# Patient Record
Sex: MALE | NOT HISPANIC OR LATINO | Employment: FULL TIME | URBAN - METROPOLITAN AREA
[De-identification: names, ages, dates, MRNs, and addresses within clinical notes are randomized per-mention and may not be internally consistent; named-entity substitution may affect disease eponyms.]

---

## 2024-05-17 ENCOUNTER — LAB (OUTPATIENT)
Dept: LAB | Facility: LAB | Age: 39
End: 2024-05-17
Payer: COMMERCIAL

## 2024-05-17 ENCOUNTER — OFFICE VISIT (OUTPATIENT)
Dept: PRIMARY CARE | Facility: CLINIC | Age: 39
End: 2024-05-17
Payer: COMMERCIAL

## 2024-05-17 VITALS
BODY MASS INDEX: 33.82 KG/M2 | WEIGHT: 255.2 LBS | SYSTOLIC BLOOD PRESSURE: 125 MMHG | TEMPERATURE: 97.3 F | DIASTOLIC BLOOD PRESSURE: 82 MMHG | OXYGEN SATURATION: 96 % | HEIGHT: 73 IN | HEART RATE: 79 BPM

## 2024-05-17 DIAGNOSIS — J30.9 ALLERGIC RHINITIS, UNSPECIFIED SEASONALITY, UNSPECIFIED TRIGGER: ICD-10-CM

## 2024-05-17 DIAGNOSIS — R50.9 FEVER, UNSPECIFIED FEVER CAUSE: ICD-10-CM
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
## 2024-05-17 DIAGNOSIS — S30.861A TICK BITE OF ABDOMINAL WALL, INITIAL ENCOUNTER: Primary | ICD-10-CM

## 2024-05-17 DIAGNOSIS — F41.9 ANXIETY: ICD-10-CM

## 2024-05-17 DIAGNOSIS — S30.861A TICK BITE OF ABDOMINAL WALL, INITIAL ENCOUNTER: ICD-10-CM

## 2024-05-17 PROBLEM — N43.0: Status: ACTIVE | Noted: 2017-06-08

## 2024-05-17 PROBLEM — L98.9 SKIN LESION: Status: ACTIVE | Noted: 2024-05-17

## 2024-05-17 PROBLEM — I86.1 VARICOCELE: Status: ACTIVE | Noted: 2017-06-08

## 2024-05-17 PROBLEM — R46.81 OBSESSIVE-COMPULSIVE BEHAVIOR: Status: ACTIVE | Noted: 2024-05-17

## 2024-05-17 PROBLEM — N43.40 SPERMATOCELE: Status: ACTIVE | Noted: 2017-06-08

## 2024-05-17 PROBLEM — F43.21 GRIEF: Status: ACTIVE | Noted: 2024-05-17

## 2024-05-17 LAB
ALBUMIN SERPL BCP-MCNC: 4.4 G/DL (ref 3.4–5)
ALP SERPL-CCNC: 55 U/L (ref 33–120)
ALT SERPL W P-5'-P-CCNC: 63 U/L (ref 10–52)
ANION GAP SERPL CALC-SCNC: 13 MMOL/L (ref 10–20)
AST SERPL W P-5'-P-CCNC: 40 U/L (ref 9–39)
BASOPHILS # BLD MANUAL: 0 X10*3/UL (ref 0–0.1)
BASOPHILS NFR BLD MANUAL: 0 %
BILIRUB SERPL-MCNC: 0.5 MG/DL (ref 0–1.2)
BUN SERPL-MCNC: 19 MG/DL (ref 6–23)
BURR CELLS BLD QL SMEAR: NORMAL
CALCIUM SERPL-MCNC: 9.7 MG/DL (ref 8.6–10.6)
CHLORIDE SERPL-SCNC: 105 MMOL/L (ref 98–107)
CO2 SERPL-SCNC: 29 MMOL/L (ref 21–32)
CREAT SERPL-MCNC: 0.92 MG/DL (ref 0.5–1.3)
EGFRCR SERPLBLD CKD-EPI 2021: >90 ML/MIN/1.73M*2
EOSINOPHIL # BLD MANUAL: 0.05 X10*3/UL (ref 0–0.7)
EOSINOPHIL NFR BLD MANUAL: 0.9 %
ERYTHROCYTE [DISTWIDTH] IN BLOOD BY AUTOMATED COUNT: 12.9 % (ref 11.5–14.5)
ERYTHROCYTE [SEDIMENTATION RATE] IN BLOOD BY WESTERGREN METHOD: 4 MM/H (ref 0–15)
GLUCOSE SERPL-MCNC: 84 MG/DL (ref 74–99)
HCT VFR BLD AUTO: 46.2 % (ref 41–52)
HGB BLD-MCNC: 14.5 G/DL (ref 13.5–17.5)
IMM GRANULOCYTES # BLD AUTO: 0.02 X10*3/UL (ref 0–0.7)
IMM GRANULOCYTES NFR BLD AUTO: 0.4 % (ref 0–0.9)
LYMPHOCYTES # BLD MANUAL: 1.24 X10*3/UL (ref 1.2–4.8)
LYMPHOCYTES NFR BLD MANUAL: 21.8 %
MCH RBC QN AUTO: 27.7 PG (ref 26–34)
MCHC RBC AUTO-ENTMCNC: 31.4 G/DL (ref 32–36)
MCV RBC AUTO: 88 FL (ref 80–100)
MONOCYTES # BLD MANUAL: 0.35 X10*3/UL (ref 0.1–1)
MONOCYTES NFR BLD MANUAL: 6.1 %
NEUTROPHILS # BLD MANUAL: 3.52 X10*3/UL (ref 1.2–7.7)
NEUTS BAND # BLD MANUAL: 0.3 X10*3/UL (ref 0–0.7)
NEUTS BAND NFR BLD MANUAL: 5.2 %
NEUTS SEG # BLD MANUAL: 3.22 X10*3/UL (ref 1.2–7)
NEUTS SEG NFR BLD MANUAL: 56.5 %
NRBC BLD-RTO: 0 /100 WBCS (ref 0–0)
PLASMA CELLS # BLD MANUAL: 0.1 X10*3/UL
PLASMA CELLS NFR BLD MANUAL: 1.7 %
PLATELET # BLD AUTO: 308 X10*3/UL (ref 150–450)
POTASSIUM SERPL-SCNC: 5.3 MMOL/L (ref 3.5–5.3)
PROT SERPL-MCNC: 7.3 G/DL (ref 6.4–8.2)
RBC # BLD AUTO: 5.23 X10*6/UL (ref 4.5–5.9)
RBC MORPH BLD: NORMAL
SODIUM SERPL-SCNC: 142 MMOL/L (ref 136–145)
TOTAL CELLS COUNTED BLD: 115
VARIANT LYMPHS # BLD MANUAL: 0.44 X10*3/UL (ref 0–0.5)
VARIANT LYMPHS NFR BLD: 7.8 %
WBC # BLD AUTO: 5.7 X10*3/UL (ref 4.4–11.3)

## 2024-05-17 PROCEDURE — 85027 COMPLETE CBC AUTOMATED: CPT

## 2024-05-17 PROCEDURE — 85007 BL SMEAR W/DIFF WBC COUNT: CPT

## 2024-05-17 PROCEDURE — 87476 LYME DIS DNA AMP PROBE: CPT

## 2024-05-17 PROCEDURE — 99214 OFFICE O/P EST MOD 30 MIN: CPT | Performed by: INTERNAL MEDICINE

## 2024-05-17 PROCEDURE — 80053 COMPREHEN METABOLIC PANEL: CPT

## 2024-05-17 PROCEDURE — 86617 LYME DISEASE ANTIBODY: CPT

## 2024-05-17 PROCEDURE — 36415 COLL VENOUS BLD VENIPUNCTURE: CPT

## 2024-05-17 PROCEDURE — 86618 LYME DISEASE ANTIBODY: CPT

## 2024-05-17 PROCEDURE — 85652 RBC SED RATE AUTOMATED: CPT

## 2024-05-17 RX ORDER — DOXYCYCLINE 100 MG/1
100 CAPSULE ORAL
COMMUNITY
Start: 2024-05-12 | End: 2024-05-21 | Stop reason: SDUPTHER

## 2024-05-17 ASSESSMENT — PATIENT HEALTH QUESTIONNAIRE - PHQ9
1. LITTLE INTEREST OR PLEASURE IN DOING THINGS: NOT AT ALL
2. FEELING DOWN, DEPRESSED OR HOPELESS: NOT AT ALL
SUM OF ALL RESPONSES TO PHQ9 QUESTIONS 1 AND 2: 0

## 2024-05-17 NOTE — PROGRESS NOTES
"Subjective   Patient ID: Mino Negron is a 38 y.o. male who presents for New Patient Visit, Tick Removal (Patient was bit by a tick and is having symptoms of lyme disease.), and Discuss OCD behavior .    This is a 38-year-old male, patient of Dr. Garay, was advised by his wife Inocencia, who is my patient, to be seen today to have blood work and because of concern about Lyme disease . On 4/28 Patient went for a walk at [facility]  and few days after on May 1, 2024 he noticed a tick over the right lower abdomen that was attached, it was alive and his wife pulled it out with her fingers and  kept it in a jar and on Sunday,then he started noticing some redness around the tick bite site with a bulls eyes and on 5/19 he started feeling unwell,had fever, fatigue ,temperature 103 ,felt cold ,headache and joint pain, he also noted enlarged lymph node in the right groin area , he went to the urgent care and was started on doxycycline for 2 weeks, they did not do any blood tests as per patient, they did identify the tick when showed to the physician at the urgent care by patient starts feeling better his,fever came down.  He is known to have OCD and anxiety.         Review of Systems   Constitutional:  Positive for fatigue and fever.        As HPI.   HENT: Negative.     Eyes: Negative.    Respiratory: Negative.     Cardiovascular: Negative.    Gastrointestinal: Negative.    Endocrine: Negative.    Genitourinary: Negative.    Musculoskeletal:         As HPI   Neurological:  Positive for headaches.   Hematological:  Positive for adenopathy. Does not bruise/bleed easily.        Noted enlarged lymph node right inguinal area.   Psychiatric/Behavioral:          As anxiety and OCD.       Objective   /82   Pulse 79   Temp 36.3 °C (97.3 °F) (Temporal)   Ht 1.854 m (6' 1\")   Wt 116 kg (255 lb 3.2 oz)   SpO2 96%   BMI 33.67 kg/m²     Physical Exam  Vitals reviewed.   Constitutional:       General: He is not " in acute distress.     Appearance: Normal appearance.   HENT:      Head: Normocephalic and atraumatic.   Eyes:      Extraocular Movements: Extraocular movements intact.      Pupils: Pupils are equal, round, and reactive to light.   Cardiovascular:      Rate and Rhythm: Normal rate and regular rhythm.      Heart sounds: Normal heart sounds.   Pulmonary:      Effort: Pulmonary effort is normal. No respiratory distress.      Breath sounds: Normal breath sounds. No wheezing or rhonchi.   Abdominal:      General: Abdomen is flat. Bowel sounds are normal.      Palpations: Abdomen is soft.      Tenderness: There is no abdominal tenderness.      Comments: Noted oval-shaped erythematous skin eruption over right lower abdomen centered by the tick bite site, no retained insect noted, no drainage and no tenderness.  Small enlarged lymph node right inguinal area.   Musculoskeletal:      Cervical back: Normal range of motion and neck supple.      Right lower leg: No edema.      Left lower leg: No edema.   Skin:     General: Skin is warm and dry.      Comments: Tick bite site with surrounding erythema right lower abdomen   Neurological:      General: No focal deficit present.      Mental Status: He is alert and oriented to person, place, and time.   Psychiatric:         Mood and Affect: Mood normal.         Behavior: Behavior normal.         Assessment/Plan   Problem List Items Addressed This Visit             ICD-10-CM    Anxiety F41.9     Patient to discuss his anxiety and OCD with his PCP.  Recommend counseling and treatment.         Tick bite of abdominal wall - Primary S30.861A, W57.XXXA     Suspect Lyme disease.  Continue doxycycline, already had prescription for 2 weeks.  Will order lab.  Advised to follow-up with his PCP Dr. Garay in 1 to 2 weeks.           Relevant Orders    CBC and Auto Differential (Completed)    Comprehensive metabolic panel (Completed)    Sedimentation Rate (Completed)    Lyme AB Modified 2-Tier  Testing, 1st Tier    Lyme Disease (Borrelia burgdorferi), PCR    Allergic rhinitis J30.9     Zyrtec as needed.          Other Visit Diagnoses         Codes    Fever, unspecified fever cause     R50.9    Relevant Orders    CBC and Auto Differential (Completed)    Comprehensive metabolic panel (Completed)    Sedimentation Rate (Completed)

## 2024-05-18 PROBLEM — J30.9 ALLERGIC RHINITIS: Status: ACTIVE | Noted: 2024-05-18

## 2024-05-18 ASSESSMENT — ENCOUNTER SYMPTOMS
FATIGUE: 1
BRUISES/BLEEDS EASILY: 0
ADENOPATHY: 1
CARDIOVASCULAR NEGATIVE: 1
EYES NEGATIVE: 1
FEVER: 1
GASTROINTESTINAL NEGATIVE: 1
RESPIRATORY NEGATIVE: 1
ENDOCRINE NEGATIVE: 1
HEADACHES: 1

## 2024-05-18 NOTE — ASSESSMENT & PLAN NOTE
Suspect Lyme disease.  Continue doxycycline, already had prescription for 2 weeks.  Will order lab.  Advised to follow-up with his PCP Dr. Garay in 1 to 2 weeks.

## 2024-05-20 LAB
B BURGDOR IGG SERPL QL IA: 0.59 IV
B BURGDOR IGG+IGM SER IA-IMP: POSITIVE
B BURGDOR IGM SERPL QL IA: 6.44 IV
B BURGDOR.VLSE1+PEPC10 AB SER IA-ACNC: 2.92 IV

## 2024-05-21 ENCOUNTER — TELEPHONE (OUTPATIENT)
Dept: PRIMARY CARE | Facility: CLINIC | Age: 39
End: 2024-05-21
Payer: COMMERCIAL

## 2024-05-21 DIAGNOSIS — A69.20 ACUTE LYME DISEASE: Primary | ICD-10-CM

## 2024-05-21 LAB
B BURGDOR DNA SPEC QL NAA+PROBE: NOT DETECTED
SPECIMEN SOURCE: NORMAL

## 2024-05-21 RX ORDER — DOXYCYCLINE 100 MG/1
CAPSULE ORAL
Qty: 14 CAPSULE | Refills: 0 | Status: SHIPPED | OUTPATIENT
Start: 2024-05-21 | End: 2024-06-06 | Stop reason: WASHOUT

## 2024-05-21 NOTE — TELEPHONE ENCOUNTER
I called patient and discussed lab results with him, he currently has prescription for doxycycline 100 mg twice daily for total 14 days from the urgent care, it was started 2 weeks after the discovered tick bite, we will extend his doxycycline for total of 21-days due to his initial severe symptoms with the headache , fever and fatigue.  He will follow-up with Dr. Garay.

## 2024-05-21 NOTE — TELEPHONE ENCOUNTER
Patient was seen 5/17 for tick bite and prescribe medication. Patient states lab test confirmed Lyme disease on same day. Patient states he was already on the medication x 1 week prior to lab test and wants to know if he would need more doxycycline 100 mg.     Please advise

## 2024-06-06 ENCOUNTER — OFFICE VISIT (OUTPATIENT)
Dept: PRIMARY CARE | Facility: CLINIC | Age: 39
End: 2024-06-06
Payer: COMMERCIAL

## 2024-06-06 VITALS
OXYGEN SATURATION: 97 % | BODY MASS INDEX: 34.59 KG/M2 | HEART RATE: 72 BPM | WEIGHT: 261 LBS | RESPIRATION RATE: 16 BRPM | DIASTOLIC BLOOD PRESSURE: 85 MMHG | SYSTOLIC BLOOD PRESSURE: 124 MMHG | TEMPERATURE: 98.4 F | HEIGHT: 73 IN

## 2024-06-06 DIAGNOSIS — Z00.00 HEALTHCARE MAINTENANCE: ICD-10-CM

## 2024-06-06 DIAGNOSIS — R79.89 ELEVATED LFTS: Primary | ICD-10-CM

## 2024-06-06 PROCEDURE — 99395 PREV VISIT EST AGE 18-39: CPT | Performed by: INTERNAL MEDICINE

## 2024-06-06 PROCEDURE — 90715 TDAP VACCINE 7 YRS/> IM: CPT | Performed by: INTERNAL MEDICINE

## 2024-06-06 PROCEDURE — 1036F TOBACCO NON-USER: CPT | Performed by: INTERNAL MEDICINE

## 2024-06-06 PROCEDURE — 90471 IMMUNIZATION ADMIN: CPT | Performed by: INTERNAL MEDICINE

## 2024-06-06 ASSESSMENT — ENCOUNTER SYMPTOMS
HEMATURIA: 0
COUGH: 0
DYSURIA: 0
EYE PAIN: 0
PALPITATIONS: 0
NAUSEA: 0
ABDOMINAL PAIN: 0
FEVER: 0
CHEST TIGHTNESS: 0
WHEEZING: 0
SORE THROAT: 0
CONSTIPATION: 0
RHINORRHEA: 0
BLOOD IN STOOL: 0
WOUND: 0
DIARRHEA: 0
POLYPHAGIA: 0
CHILLS: 0
ARTHRALGIAS: 0
HEADACHES: 0
VOMITING: 0
UNEXPECTED WEIGHT CHANGE: 0
SHORTNESS OF BREATH: 0
DYSPHORIC MOOD: 0
DIZZINESS: 0
MYALGIAS: 0
NERVOUS/ANXIOUS: 1
POLYDIPSIA: 0
FREQUENCY: 0

## 2024-06-06 NOTE — PROGRESS NOTES
"Subjective   Patient ID: Mino Negron is a 38 y.o. male who presents for Annual Exam.    HPI     Finished antibiotics  All resolved  Feels back to normal.    Dental visits- UTD  Eye visits-glasses, overdue    Exercise-yoga 1x per week  Diet-not the best currently, eats a lot of fast food    Alcohol use-none since January  Smoking- stopped weed Jan, no cigs    He does worry a lot  Wonders if has OCD, autism, etc    Review of Systems   Constitutional:  Negative for chills, fever and unexpected weight change.   HENT:  Negative for congestion, hearing loss, rhinorrhea and sore throat.    Eyes:  Negative for pain and visual disturbance.   Respiratory:  Negative for cough, chest tightness, shortness of breath and wheezing.    Cardiovascular:  Negative for chest pain and palpitations.   Gastrointestinal:  Negative for abdominal pain, blood in stool, constipation, diarrhea, nausea and vomiting.   Endocrine: Negative for cold intolerance, heat intolerance, polydipsia and polyphagia.   Genitourinary:  Negative for dysuria, frequency and hematuria.   Musculoskeletal:  Negative for arthralgias and myalgias.   Skin:  Negative for rash and wound.   Neurological:  Negative for dizziness, syncope and headaches.   Psychiatric/Behavioral:  Negative for dysphoric mood. The patient is nervous/anxious.        Objective   /85 (BP Location: Left arm, Patient Position: Sitting, BP Cuff Size: Large adult)   Pulse 72   Temp 36.9 °C (98.4 °F)   Resp 16   Ht 1.854 m (6' 1\")   Wt 118 kg (261 lb)   SpO2 97%   BMI 34.43 kg/m²     Physical Exam  Vitals reviewed.   Constitutional:       Appearance: Normal appearance. He is not ill-appearing.   HENT:      Head: Normocephalic and atraumatic.      Right Ear: Tympanic membrane normal.      Left Ear: Tympanic membrane normal.      Nose: Nose normal.      Mouth/Throat:      Mouth: Mucous membranes are moist.      Pharynx: Oropharynx is clear.   Eyes:      Extraocular Movements: Extraocular " movements intact.      Conjunctiva/sclera: Conjunctivae normal.      Pupils: Pupils are equal, round, and reactive to light.   Cardiovascular:      Rate and Rhythm: Normal rate and regular rhythm.   Pulmonary:      Effort: Pulmonary effort is normal.      Breath sounds: Normal breath sounds. No wheezing.   Abdominal:      General: There is no distension.      Palpations: Abdomen is soft. There is no mass.      Tenderness: There is no abdominal tenderness.   Musculoskeletal:         General: No swelling.      Cervical back: Neck supple.   Lymphadenopathy:      Cervical: No cervical adenopathy.   Neurological:      General: No focal deficit present.      Mental Status: He is alert and oriented to person, place, and time.      Gait: Gait normal.   Psychiatric:         Mood and Affect: Mood normal.         Behavior: Behavior normal.         Thought Content: Thought content normal.         Assessment/Plan   Problem List Items Addressed This Visit    None  Visit Diagnoses         Codes    Elevated LFTs    -  Primary R79.89    Relevant Orders    Hepatic function panel    Healthcare maintenance     Z00.00    Relevant Orders    Lipid Panel          CPE completed.  Advised to keep a heart healthy, low fat  diet with fruits and veggies like Mediterranean diet.  Advised on the importance of exercise and maintaining 150 minutes of exercise per week (30 minutes per day 5 days a week).  Advised on regular eye and dental visits.  Discussed age appropriate cancer screening, immunizations and recommendations given.  Discussed avoiding illicit drugs and tobacco. Advised on appropriate use of alcohol.  Advised to wear seat belt.    Elevated lfts-repeat     OCD tendencies/? Autism-given info on how to schedule with psychologist if wishes to     Hx of Lyme disease     Tdap today  . works full time job and has [employer] (yoga classes at [employer], coffee shops, etc)     CPE 3 month  Check labs

## 2025-06-05 ASSESSMENT — PROMIS GLOBAL HEALTH SCALE
RATE_PHYSICAL_HEALTH: GOOD
RATE_MENTAL_HEALTH: GOOD
RATE_QUALITY_OF_LIFE: GOOD
RATE_AVERAGE_FATIGUE: MODERATE
RATE_AVERAGE_PAIN: 6
RATE_GENERAL_HEALTH: GOOD
CARRYOUT_PHYSICAL_ACTIVITIES: COMPLETELY
EMOTIONAL_PROBLEMS: OFTEN
RATE_SOCIAL_SATISFACTION: VERY GOOD
CARRYOUT_SOCIAL_ACTIVITIES: GOOD

## 2025-06-06 ENCOUNTER — APPOINTMENT (OUTPATIENT)
Dept: PRIMARY CARE | Facility: CLINIC | Age: 40
End: 2025-06-06
Payer: COMMERCIAL

## 2025-06-06 VITALS
WEIGHT: 239.8 LBS | OXYGEN SATURATION: 98 % | SYSTOLIC BLOOD PRESSURE: 110 MMHG | HEART RATE: 68 BPM | HEIGHT: 73 IN | BODY MASS INDEX: 31.78 KG/M2 | DIASTOLIC BLOOD PRESSURE: 78 MMHG

## 2025-06-06 DIAGNOSIS — R79.89 ELEVATED LFTS: ICD-10-CM

## 2025-06-06 DIAGNOSIS — Z00.00 HEALTHCARE MAINTENANCE: Primary | ICD-10-CM

## 2025-06-06 DIAGNOSIS — E78.5 HYPERLIPIDEMIA, UNSPECIFIED HYPERLIPIDEMIA TYPE: ICD-10-CM

## 2025-06-06 PROCEDURE — 99395 PREV VISIT EST AGE 18-39: CPT | Performed by: INTERNAL MEDICINE

## 2025-06-06 PROCEDURE — 3008F BODY MASS INDEX DOCD: CPT | Performed by: INTERNAL MEDICINE

## 2025-06-06 PROCEDURE — 1036F TOBACCO NON-USER: CPT | Performed by: INTERNAL MEDICINE

## 2025-06-06 ASSESSMENT — ENCOUNTER SYMPTOMS
SORE THROAT: 0
BLOOD IN STOOL: 0
DYSURIA: 0
HEADACHES: 0
SHORTNESS OF BREATH: 0
HEMATURIA: 0
ARTHRALGIAS: 0
RHINORRHEA: 0
DYSPHORIC MOOD: 0
CHILLS: 0
VOMITING: 0
NERVOUS/ANXIOUS: 1
WOUND: 0
ABDOMINAL PAIN: 0
PALPITATIONS: 0
EYE PAIN: 0
NAUSEA: 0
POLYPHAGIA: 0
FREQUENCY: 0
CHEST TIGHTNESS: 0
DIZZINESS: 0
WHEEZING: 0
MYALGIAS: 0
DIARRHEA: 0
COUGH: 0
FEVER: 0
UNEXPECTED WEIGHT CHANGE: 0
CONSTIPATION: 0
POLYDIPSIA: 0

## 2025-06-06 NOTE — PROGRESS NOTES
"Subjective   Patient ID: Mino Negron is a 39 y.o. male who presents for Annual Exam.    HPI     Dental visits-  UTD  Eye visits- glasses, overdue    Exercise-Walks some, nothing formal,does have weights    Alcohol use- none  Smoking- none, marijuana    Never did labs  Never pursued counseling  States does not want to invest  Deals with his anxiety and OCD for now  May consider in future    Review of Systems   Constitutional:  Negative for chills, fever and unexpected weight change.   HENT:  Negative for congestion, hearing loss, rhinorrhea and sore throat.    Eyes:  Negative for pain and visual disturbance.   Respiratory:  Negative for cough, chest tightness, shortness of breath and wheezing.    Cardiovascular:  Negative for chest pain and palpitations.   Gastrointestinal:  Negative for abdominal pain, blood in stool, constipation, diarrhea, nausea and vomiting.   Endocrine: Negative for cold intolerance, heat intolerance, polydipsia and polyphagia.   Genitourinary:  Negative for dysuria, frequency and hematuria.   Musculoskeletal:  Negative for arthralgias and myalgias.   Skin:  Negative for rash and wound.   Neurological:  Negative for dizziness, syncope and headaches.   Psychiatric/Behavioral:  Negative for dysphoric mood. The patient is nervous/anxious.        Objective   /78 (BP Location: Right arm, Patient Position: Sitting, BP Cuff Size: Adult)   Pulse 68   Ht 1.854 m (6' 1\")   Wt 109 kg (239 lb 12.8 oz)   SpO2 98%   BMI 31.64 kg/m²     Physical Exam  Vitals reviewed.   Constitutional:       Appearance: Normal appearance. He is not ill-appearing.   HENT:      Head: Normocephalic and atraumatic.      Right Ear: Tympanic membrane normal.      Left Ear: Tympanic membrane normal.      Nose: Nose normal.      Mouth/Throat:      Mouth: Mucous membranes are moist.      Pharynx: Oropharynx is clear.   Eyes:      Extraocular Movements: Extraocular movements intact.      Conjunctiva/sclera: Conjunctivae " normal.      Pupils: Pupils are equal, round, and reactive to light.   Cardiovascular:      Rate and Rhythm: Normal rate and regular rhythm.   Pulmonary:      Effort: Pulmonary effort is normal.      Breath sounds: Normal breath sounds. No wheezing.   Abdominal:      General: There is no distension.      Palpations: Abdomen is soft. There is no mass.      Tenderness: There is no abdominal tenderness.   Musculoskeletal:      Cervical back: Neck supple.      Right lower leg: No edema.      Left lower leg: No edema.   Lymphadenopathy:      Cervical: No cervical adenopathy.   Neurological:      General: No focal deficit present.      Mental Status: He is alert and oriented to person, place, and time.      Gait: Gait normal.   Psychiatric:         Mood and Affect: Mood normal.         Behavior: Behavior normal.         Thought Content: Thought content normal.         Assessment/Plan   Problem List Items Addressed This Visit    None  Visit Diagnoses         Codes      Healthcare maintenance    -  Primary Z00.00    Relevant Orders    CBC    Lipid Panel      Elevated LFTs     R79.89    Relevant Orders    Comprehensive Metabolic Panel      Hyperlipidemia, unspecified hyperlipidemia type     E78.5    Relevant Orders    Lipid Panel             CPE completed.  Advised to keep a heart healthy, low fat  diet with fruits and veggies like Mediterranean diet.  Advised on the importance of exercise and maintaining 150 minutes of exercise per week (30 minutes per day 5 days a week).  Advised on regular eye and dental visits.  Discussed age appropriate cancer screening, immunizations and recommendations given.  Discussed avoiding illicit drugs and tobacco. Advised on appropriate use of alcohol.  Advised to wear seat belt.     Elevated lfts-repeat--never did  -if elevated, hepatology, liver ultrasound and additional labs      OCD tendencies/? Autism-given info on how to schedule with psychologist if wishes to     Hx of Lyme disease      Tdap UTD (2024)  . works full time job and has [employer] (yoga classes at [employer], coffee shops, etc)     CPE 1 year  C-scope at 45

## 2025-06-07 LAB
ALBUMIN SERPL-MCNC: 4.9 G/DL (ref 3.6–5.1)
ALP SERPL-CCNC: 56 U/L (ref 36–130)
ALT SERPL-CCNC: 28 U/L (ref 9–46)
ANION GAP SERPL CALCULATED.4IONS-SCNC: 8 MMOL/L (CALC) (ref 7–17)
AST SERPL-CCNC: 19 U/L (ref 10–40)
BILIRUB SERPL-MCNC: 0.8 MG/DL (ref 0.2–1.2)
BUN SERPL-MCNC: 11 MG/DL (ref 7–25)
CALCIUM SERPL-MCNC: 9.8 MG/DL (ref 8.6–10.3)
CHLORIDE SERPL-SCNC: 106 MMOL/L (ref 98–110)
CHOLEST SERPL-MCNC: 162 MG/DL
CHOLEST/HDLC SERPL: 3.8 (CALC)
CO2 SERPL-SCNC: 27 MMOL/L (ref 20–32)
CREAT SERPL-MCNC: 0.85 MG/DL (ref 0.6–1.26)
EGFRCR SERPLBLD CKD-EPI 2021: 113 ML/MIN/1.73M2
ERYTHROCYTE [DISTWIDTH] IN BLOOD BY AUTOMATED COUNT: 13 % (ref 11–15)
GLUCOSE SERPL-MCNC: 92 MG/DL (ref 65–99)
HCT VFR BLD AUTO: 48.9 % (ref 38.5–50)
HDLC SERPL-MCNC: 43 MG/DL
HGB BLD-MCNC: 15.7 G/DL (ref 13.2–17.1)
LDLC SERPL CALC-MCNC: 103 MG/DL (CALC)
MCH RBC QN AUTO: 28.6 PG (ref 27–33)
MCHC RBC AUTO-ENTMCNC: 32.1 G/DL (ref 32–36)
MCV RBC AUTO: 89.1 FL (ref 80–100)
NONHDLC SERPL-MCNC: 119 MG/DL (CALC)
PLATELET # BLD AUTO: 304 THOUSAND/UL (ref 140–400)
PMV BLD REES-ECKER: 9.9 FL (ref 7.5–12.5)
POTASSIUM SERPL-SCNC: 5.3 MMOL/L (ref 3.5–5.3)
PROT SERPL-MCNC: 7.2 G/DL (ref 6.1–8.1)
RBC # BLD AUTO: 5.49 MILLION/UL (ref 4.2–5.8)
SODIUM SERPL-SCNC: 141 MMOL/L (ref 135–146)
TRIGL SERPL-MCNC: 73 MG/DL
WBC # BLD AUTO: 6.8 THOUSAND/UL (ref 3.8–10.8)

## 2026-06-09 ENCOUNTER — APPOINTMENT (OUTPATIENT)
Dept: PRIMARY CARE | Facility: CLINIC | Age: 41
End: 2026-06-09
Payer: COMMERCIAL